# Patient Record
Sex: MALE | Race: WHITE | NOT HISPANIC OR LATINO | Employment: FULL TIME | URBAN - METROPOLITAN AREA
[De-identification: names, ages, dates, MRNs, and addresses within clinical notes are randomized per-mention and may not be internally consistent; named-entity substitution may affect disease eponyms.]

---

## 2025-02-20 ENCOUNTER — OFFICE VISIT (OUTPATIENT)
Dept: FAMILY MEDICINE | Facility: CLINIC | Age: 30
End: 2025-02-20

## 2025-02-20 VITALS
HEART RATE: 104 BPM | WEIGHT: 145 LBS | SYSTOLIC BLOOD PRESSURE: 105 MMHG | DIASTOLIC BLOOD PRESSURE: 77 MMHG | OXYGEN SATURATION: 98 % | HEIGHT: 69 IN | BODY MASS INDEX: 21.48 KG/M2

## 2025-02-20 DIAGNOSIS — Z00.00 ROUTINE GENERAL MEDICAL EXAMINATION AT A HEALTH CARE FACILITY: Primary | ICD-10-CM

## 2025-02-20 DIAGNOSIS — Z23 NEED FOR TETANUS, DIPHTHERIA, AND ACELLULAR PERTUSSIS (TDAP) VACCINE: ICD-10-CM

## 2025-02-20 DIAGNOSIS — Z11.59 NEED FOR HEPATITIS C SCREENING TEST: ICD-10-CM

## 2025-02-20 DIAGNOSIS — Z11.3 SCREEN FOR STD (SEXUALLY TRANSMITTED DISEASE): ICD-10-CM

## 2025-02-20 DIAGNOSIS — F33.0 MILD EPISODE OF RECURRENT MAJOR DEPRESSIVE DISORDER: ICD-10-CM

## 2025-02-20 DIAGNOSIS — N48.1 BALANITIS: ICD-10-CM

## 2025-02-20 LAB
% GRANULOCYTES: 70 % (ref 42.2–75.2)
ALBUMIN SERPL BCG-MCNC: 4.7 G/DL (ref 3.5–5.2)
ALP SERPL-CCNC: 74 U/L (ref 40–150)
ALT SERPL W P-5'-P-CCNC: 17 U/L (ref 0–70)
ANION GAP SERPL CALCULATED.3IONS-SCNC: 10 MMOL/L (ref 7–15)
AST SERPL W P-5'-P-CCNC: 22 U/L (ref 0–45)
BILIRUB SERPL-MCNC: 0.7 MG/DL
BUN SERPL-MCNC: 6.4 MG/DL (ref 6–20)
CALCIUM SERPL-MCNC: 9.4 MG/DL (ref 8.8–10.4)
CHLORIDE SERPL-SCNC: 101 MMOL/L (ref 98–107)
CHOLESTEROL: 134 MG/DL (ref 100–199)
CREAT SERPL-MCNC: 0.76 MG/DL (ref 0.67–1.17)
EGFRCR SERPLBLD CKD-EPI 2021: >90 ML/MIN/1.73M2
FASTING STATUS PATIENT QL REPORTED: NO
FASTING?: NO
GLUCOSE SERPL-MCNC: 82 MG/DL (ref 70–99)
HCO3 SERPL-SCNC: 29 MMOL/L (ref 22–29)
HCT VFR BLD AUTO: 41.3 % (ref 39–51)
HDL (RMG): 42 MG/DL (ref 40–?)
HEMOGLOBIN: 14.5 G/DL (ref 13.4–17.5)
LDL CALCULATED (RMG): 80 MG/DL (ref 0–130)
LYMPHOCYTES NFR BLD AUTO: 22.1 % (ref 20.5–51.1)
MCH RBC QN AUTO: 29.4 PG (ref 27–31)
MCHC RBC AUTO-ENTMCNC: 35.2 G/DL (ref 33–37)
MCV RBC AUTO: 83.5 FL (ref 80–100)
MONOCYTES NFR BLD AUTO: 7.9 % (ref 1.7–9.3)
PLATELET # BLD AUTO: 260 K/UL (ref 140–450)
POTASSIUM SERPL-SCNC: 4 MMOL/L (ref 3.4–5.3)
PROT SERPL-MCNC: 7.1 G/DL (ref 6.4–8.3)
RBC # BLD AUTO: 4.94 X10/CMM (ref 4.2–5.9)
SODIUM SERPL-SCNC: 140 MMOL/L (ref 135–145)
TRIGLYCERIDES (RMG): 60 MG/DL (ref 0–149)
TSH SERPL DL<=0.005 MIU/L-ACNC: 2.07 UIU/ML (ref 0.3–4.2)
WBC # BLD AUTO: 5.4 X10/CMM (ref 3.8–11)

## 2025-02-20 PROCEDURE — 82565 ASSAY OF CREATININE: CPT | Performed by: STUDENT IN AN ORGANIZED HEALTH CARE EDUCATION/TRAINING PROGRAM

## 2025-02-20 PROCEDURE — 36415 COLL VENOUS BLD VENIPUNCTURE: CPT | Performed by: STUDENT IN AN ORGANIZED HEALTH CARE EDUCATION/TRAINING PROGRAM

## 2025-02-20 PROCEDURE — 84443 ASSAY THYROID STIM HORMONE: CPT | Performed by: STUDENT IN AN ORGANIZED HEALTH CARE EDUCATION/TRAINING PROGRAM

## 2025-02-20 SDOH — HEALTH STABILITY: PHYSICAL HEALTH: ON AVERAGE, HOW MANY DAYS PER WEEK DO YOU ENGAGE IN MODERATE TO STRENUOUS EXERCISE (LIKE A BRISK WALK)?: 2 DAYS

## 2025-02-20 SDOH — HEALTH STABILITY: PHYSICAL HEALTH: ON AVERAGE, HOW MANY MINUTES DO YOU ENGAGE IN EXERCISE AT THIS LEVEL?: 30 MIN

## 2025-02-20 ASSESSMENT — PATIENT HEALTH QUESTIONNAIRE - PHQ9
SUM OF ALL RESPONSES TO PHQ QUESTIONS 1-9: 9
10. IF YOU CHECKED OFF ANY PROBLEMS, HOW DIFFICULT HAVE THESE PROBLEMS MADE IT FOR YOU TO DO YOUR WORK, TAKE CARE OF THINGS AT HOME, OR GET ALONG WITH OTHER PEOPLE: SOMEWHAT DIFFICULT
SUM OF ALL RESPONSES TO PHQ QUESTIONS 1-9: 9

## 2025-02-20 ASSESSMENT — ENCOUNTER SYMPTOMS
BLOOD IN STOOL: 0
PALPITATIONS: 0
CHILLS: 0
DIZZINESS: 0
NAUSEA: 0
HEMATURIA: 0
COUGH: 0
ABDOMINAL PAIN: 0
CONSTIPATION: 0
FEVER: 0
HEADACHES: 0
BLURRED VISION: 0
DIARRHEA: 0
VOMITING: 0
SHORTNESS OF BREATH: 0
SORE THROAT: 0

## 2025-02-20 ASSESSMENT — SOCIAL DETERMINANTS OF HEALTH (SDOH): HOW OFTEN DO YOU GET TOGETHER WITH FRIENDS OR RELATIVES?: TWICE A WEEK

## 2025-02-20 NOTE — PROGRESS NOTES
Preventive Care Visit  [facility]  Dolly Sauer DO, Family Medicine  Feb 20, 2025      Assessment & Plan   Problem List Items Addressed This Visit       Routine general medical examination at a health care facility - Primary     -Will order appropriate blood work for patient's age, medical conditions and BMI  -Reviewed recommended screening and health guidelines during this visit          Relevant Orders    TDAP 7+ (ADACEL,BOOSTRIX) (Completed)    HIV Antigen Antibody Combo Cascade    TSH with free T4 reflex    Lipid Profile ([facility])    CBC with Diff/Plt ([facility])    Comprehensive metabolic panel    VENOUS COLLECTION (Completed)    Balanitis     -previously used ketoconazole and betamethasone  -patient denies resolution of symptoms with this regimen  -will refer to urology         Relevant Orders    Adult Urology  Referral - To [facility] Location    Mild episode of recurrent major depressive disorder     -Positive depression screening, PHQ 9 score 9  -currently utilizing therapy  -no prescription medication at this time   -previous medications: Wellbutrin-without negative side effects, without resolution of symptoms  -Zoloft, side effects of anxiety and night terrors that lead to cessation of medication  -will continue to monitor         BMI 21.0-21.9, adult     -Diet: fairly well balanced, eats decent amount of vegetables  -Exercise: walking and lifting work, no formal exercise routine         Relevant Orders    VENOUS COLLECTION (Completed)     Other Visit Diagnoses       Screen for STD (sexually transmitted disease)        Relevant Orders    HIV Antigen Antibody Combo Cascade    Chlamydia trachomatis/Neisseria gonorrhoeae by PCR - Clinic Collect    Hepatitis C Screen Reflex to HCV RNA Quant and Genotype    VENOUS COLLECTION (Completed)    Need for tetanus, diphtheria, and acellular pertussis (Tdap) vaccine        Relevant Orders    TDAP 7+ (ADACEL,BOOSTRIX) (Completed)    Need for  hepatitis C screening test        Relevant Orders    Hepatitis C Screen Reflex to HCV RNA Quant and Genotype    VENOUS COLLECTION (Completed)          Counseling  Appropriate preventive services were addressed with this patient via screening, questionnaire, or discussion as appropriate for fall prevention, nutrition, physical activity, Tobacco-use cessation, social engagement, weight loss and cognition.  Checklist reviewing preventive services available has been given to the patient.  Reviewed patient's diet, addressing concerns and/or questions.   He is at risk for lack of exercise and has been provided with information to increase physical activity for the benefit of his well-being.   The patient was instructed to see the dentist every 6 months.   He is at risk for psychosocial distress and has been provided with information to reduce risk.   The patient's PHQ-9 score is consistent with mild depression. He was provided with information regarding depression.     Return in about 53 weeks (around 2/26/2026) for Annual Wellness Visit.    Haroldo Juan is a 29 year old, presenting for the following:  Physical (Not fasting. ) and Health Maintenance (Balanitis, was previously treated for this, but does not feel like it was entirely treated. )    HPI  Social History  Household members include self  Smoking History: never smoker  Alcohol Use History: 1-2 drinks a week  Illicit Drug Use History: no  Occupation: retail    [employer]  Dental Visit/Problems: hasn't been going, hasn't established since move  Vision Problems: has not seen   Hearing Loss: no concerns  Vaccination Status:  COVID: declines  Influenza: declines  Tdap: 2012  Diet: fairly well balanced, eats decent amount of vegetables  Exercise: walking and lifting work, no formal exercise routine  Reproductive Health:  Sexually active: N   STD testing: will do today    Screening  HIV: will do today  HCV: will do today  ACP: discussed today    Patient has  no history of allergies.    Patient has no PSHx.    Patient has a Fhx of mother with depression and thyroid disease. Maternal grandfather with depression.    Patient has a PMHx of balanitis. Patient last saw urology in 2/2024 for this problem. Patient states that he feels like he did not fully recover from this episode despite ketoconazole and steroid cream treatment.    Patient has positive PHQ9 screening today. Patient states that he has a history of depression. Patient is currently in therapy. Patient has tried Wellbutrin twice without negative side effects, but without resolution of symptoms. When patient tried Zoloft, described side effects of anxiety and night terrors that lead to cessation of medication. Patient denies desire for prescription medication at this time.    Health Care Directive  Patient does not have a Health Care Directive: Discussed advance care planning with patient; however, patient will consider.        2/20/2025   General Health   How would you rate your overall physical health? Good   Feel stress (tense, anxious, or unable to sleep) To some extent   (!) STRESS CONCERN      2/20/2025   Nutrition   Three or more servings of calcium each day? (!) I DON'T KNOW   Diet: Regular (no restrictions)    Breakfast skipped   How many servings of fruit and vegetables per day? (!) 0-1   How many sweetened beverages each day? 0-1       Multiple values from one day are sorted in reverse-chronological order         2/20/2025   Exercise   Days per week of moderate/strenous exercise 2 days   Average minutes spent exercising at this level 30 min   (!) EXERCISE CONCERN      2/20/2025   Social Factors   Frequency of gathering with friends or relatives Twice a week   Worry food won't last until get money to buy more No   Food not last or not have enough money for food? No   Do you have housing? (Housing is defined as stable permanent housing and does not include staying ouside in a car, in a tent, in an  "abandoned building, in an overnight shelter, or couch-surfing.) Yes   Are you worried about losing your housing? No   Lack of transportation? No   Unable to get utilities (heat,electricity)? No         2/20/2025   Dental   Dentist two times every year? (!) NO          Today's PHQ-9 Score:       2/20/2025    12:49 PM   PHQ-9 SCORE   PHQ-9 Total Score MyChart 9 (Mild depression)   PHQ-9 Total Score 9        Patient-reported         2/20/2025   Substance Use   Alcohol more than 3/day or more than 7/wk No   Do you use any other substances recreationally? No     Social History     Tobacco Use    Smoking status: Never    Smokeless tobacco: Never   Substance Use Topics    Alcohol use: Yes     Comment: 1-2 a week    Drug use: Never             2/20/2025   One time HIV Screening   Previous HIV test? No         2/20/2025   STI Screening   New sexual partner(s) since last STI/HIV test? (!) YES           2/20/2025   Contraception/Family Planning   Questions about contraception or family planning No        Reviewed and updated as needed this visit by Provider   Tobacco     Med Hx  Surg Hx  Fam Hx  Soc Hx Sexual Activity          Past Medical History:   Diagnosis Date    Balanitis      History reviewed. No pertinent surgical history.    Review of Systems   Constitutional:  Negative for chills and fever.   HENT:  Negative for hearing loss and sore throat.    Eyes:  Negative for blurred vision.   Respiratory:  Negative for cough and shortness of breath.    Cardiovascular:  Negative for chest pain and palpitations.   Gastrointestinal:  Negative for abdominal pain, blood in stool, constipation, diarrhea, melena, nausea and vomiting.   Genitourinary:  Negative for hematuria.   Neurological:  Negative for dizziness and headaches.         Objective    Exam  /77 (BP Location: Left arm)   Pulse 104   Ht 1.759 m (5' 9.25\")   Wt 65.8 kg (145 lb)   SpO2 98%   BMI 21.26 kg/m     Estimated body mass index is 21.26 kg/m  as " "calculated from the following:    Height as of this encounter: 1.759 m (5' 9.25\").    Weight as of this encounter: 65.8 kg (145 lb).    Physical Exam  Constitutional:       General: He is not in acute distress.     Appearance: Normal appearance.   HENT:      Head: Normocephalic and atraumatic.      Right Ear: Tympanic membrane, ear canal and external ear normal. There is no impacted cerumen.      Left Ear: Tympanic membrane, ear canal and external ear normal. There is no impacted cerumen.      Mouth/Throat:      Mouth: Mucous membranes are moist.      Pharynx: No oropharyngeal exudate.   Eyes:      General:         Right eye: No discharge.         Left eye: No discharge.      Conjunctiva/sclera: Conjunctivae normal.      Pupils: Pupils are equal, round, and reactive to light.   Cardiovascular:      Rate and Rhythm: Normal rate and regular rhythm.      Pulses: Normal pulses.           Dorsalis pedis pulses are 2+ on the right side and 2+ on the left side.      Heart sounds: Normal heart sounds. No murmur heard.  Pulmonary:      Effort: Pulmonary effort is normal. No respiratory distress.      Breath sounds: Normal breath sounds.   Abdominal:      Tenderness: There is no abdominal tenderness.   Musculoskeletal:      Right lower leg: No edema.      Left lower leg: No edema.   Lymphadenopathy:      Cervical: No cervical adenopathy.   Neurological:      General: No focal deficit present.      Mental Status: He is alert.   Psychiatric:         Thought Content: Thought content normal.       Signed Electronically by: Dolly Sauer DO    "

## 2025-02-20 NOTE — ASSESSMENT & PLAN NOTE
-Diet: fairly well balanced, eats decent amount of vegetables  -Exercise: walking and lifting work, no formal exercise routine

## 2025-02-20 NOTE — ASSESSMENT & PLAN NOTE
-previously used ketoconazole and betamethasone  -patient denies resolution of symptoms with this regimen  -will refer to urology

## 2025-02-20 NOTE — ASSESSMENT & PLAN NOTE
-Positive depression screening, PHQ 9 score 9  -currently utilizing therapy  -no prescription medication at this time   -previous medications: Wellbutrin-without negative side effects, without resolution of symptoms  -Zoloft, side effects of anxiety and night terrors that lead to cessation of medication  -will continue to monitor

## 2025-02-20 NOTE — PATIENT INSTRUCTIONS
Patient Education   Preventive Care Advice   This is general advice given by our system to help you stay healthy. However, your care team may have specific advice just for you. Please talk to your care team about your preventive care needs.  Nutrition  Eat 5 or more servings of fruits and vegetables each day.  Try wheat bread, brown rice and whole grain pasta (instead of white bread, rice, and pasta).  Get enough calcium and vitamin D. Check the label on foods and aim for 100% of the RDA (recommended daily allowance).  Lifestyle  Exercise at least 150 minutes each week  (30 minutes a day, 5 days a week).  Do muscle strengthening activities 2 days a week. These help control your weight and prevent disease.  No smoking.  Wear sunscreen to prevent skin cancer.  Have a dental exam and cleaning every 6 months.  Yearly exams  See your health care team every year to talk about:  Any changes in your health.  Any medicines your care team has prescribed.  Preventive care, family planning, and ways to prevent chronic diseases.  Shots (vaccines)   HPV shots (up to age 26), if you've never had them before.  Hepatitis B shots (up to age 59), if you've never had them before.  COVID-19 shot: Get this shot when it's due.  Flu shot: Get a flu shot every year.  Tetanus shot: Get a tetanus shot every 10 years.  Pneumococcal, hepatitis A, and RSV shots: Ask your care team if you need these based on your risk.  Shingles shot (for age 50 and up)  General health tests  Diabetes screening:  Starting at age 35, Get screened for diabetes at least every 3 years.  If you are younger than age 35, ask your care team if you should be screened for diabetes.  Cholesterol test: At age 39, start having a cholesterol test every 5 years, or more often if advised.  Bone density scan (DEXA): At age 50, ask your care team if you should have this scan for osteoporosis (brittle bones).  Hepatitis C: Get tested at least once in your life.  STIs (sexually  transmitted infections)  Before age 24: Ask your care team if you should be screened for STIs.  After age 24: Get screened for STIs if you're at risk. You are at risk for STIs (including HIV) if:  You are sexually active with more than one person.  You don't use condoms every time.  You or a partner was diagnosed with a sexually transmitted infection.  If you are at risk for HIV, ask about PrEP medicine to prevent HIV.  Get tested for HIV at least once in your life, whether you are at risk for HIV or not.  Cancer screening tests  Cervical cancer screening: If you have a cervix, begin getting regular cervical cancer screening tests starting at age 21.  Breast cancer scan (mammogram): If you've ever had breasts, begin having regular mammograms starting at age 40. This is a scan to check for breast cancer.  Colon cancer screening: It is important to start screening for colon cancer at age 45.  Have a colonoscopy test every 10 years (or more often if you're at risk) Or, ask your provider about stool tests like a FIT test every year or Cologuard test every 3 years.  To learn more about your testing options, visit:   .  For help making a decision, visit:   https://bit.ly/ox80678.  Prostate cancer screening test: If you have a prostate, ask your care team if a prostate cancer screening test (PSA) at age 55 is right for you.  Lung cancer screening: If you are a current or former smoker ages 50 to 80, ask your care team if ongoing lung cancer screenings are right for you.  For informational purposes only. Not to replace the advice of your health care provider. Copyright   2023 [facility]. All rights reserved. Clinically reviewed by the [facility] Transitions Program. [employer] 242005 - REV 01/24.  Learning About Stress  What is stress?     Stress is your body's response to a hard situation. Your body can have a physical, emotional, or mental response. Stress is a fact of life for most people, and it  affects everyone differently. What causes stress for you may not be stressful for someone else.  A lot of things can cause stress. You may feel stress when you go on a job interview, take a test, or run a race. This kind of short-term stress is normal and even useful. It can help you if you need to work hard or react quickly. For example, stress can help you finish an important job on time.  Long-term stress is caused by ongoing stressful situations or events. Examples of long-term stress include long-term health problems, ongoing problems at work, or conflicts in your family. Long-term stress can harm your health.  How does stress affect your health?  When you are stressed, your body responds as though you are in danger. It makes hormones that speed up your heart, make you breathe faster, and give you a burst of energy. This is called the fight-or-flight stress response. If the stress is over quickly, your body goes back to normal and no harm is done.  But if stress happens too often or lasts too long, it can have bad effects. Long-term stress can make you more likely to get sick, and it can make symptoms of some diseases worse. If you tense up when you are stressed, you may develop neck, shoulder, or low back pain. Stress is linked to high blood pressure and heart disease.  Stress also harms your emotional health. It can make you dobbins, tense, or depressed. Your relationships may suffer, and you may not do well at work or school.  What can you do to manage stress?  You can try these things to help manage stress:   Do something active. Exercise or activity can help reduce stress. Walking is a great way to get started. Even everyday activities such as housecleaning or yard work can help.  Try yoga or ilia chi. These techniques combine exercise and meditation. You may need some training at first to learn them.  Do something you enjoy. For example, listen to music or go to a movie. Practice your hobby or do volunteer  "work.  Meditate. This can help you relax, because you are not worrying about what happened before or what may happen in the future.  Do guided imagery. Imagine yourself in any setting that helps you feel calm. You can use online videos, books, or a teacher to guide you.  Do breathing exercises. For example:  From a standing position, bend forward from the waist with your knees slightly bent. Let your arms dangle close to the floor.  Breathe in slowly and deeply as you return to a standing position. Roll up slowly and lift your head last.  Hold your breath for just a few seconds in the standing position.  Breathe out slowly and bend forward from the waist.  Let your feelings out. Talk, laugh, cry, and express anger when you need to. Talking with supportive friends or family, a counselor, or a virgil leader about your feelings is a healthy way to relieve stress. Avoid discussing your feelings with people who make you feel worse.  Write. It may help to write about things that are bothering you. This helps you find out how much stress you feel and what is causing it. When you know this, you can find better ways to cope.  What can you do to prevent stress?  You might try some of these things to help prevent stress:  Manage your time. This helps you find time to do the things you want and need to do.  Get enough sleep. Your body recovers from the stresses of the day while you are sleeping.  Get support. Your family, friends, and community can make a difference in how you experience stress.  Limit your news feed. Avoid or limit time on social media or news that may make you feel stressed.  Do something active. Exercise or activity can help reduce stress. Walking is a great way to get started.  Where can you learn more?  Go to https://www.Learn with [employer].net/patiented  Enter N032 in the search box to learn more about \"Learning About Stress.\"  Current as of: October 24, 2023  Content Version: 14.3    2024 [employer]. "   Care instructions adapted under license by your healthcare professional. If you have questions about a medical condition or this instruction, always ask your healthcare professional. [employer] disclaims any warranty or liability for your use of this information.    Learning About Depression Screening  What is depression screening?  Depression screening is a way to see if you have depression symptoms. It may be done by a doctor or counselor. It's often part of a routine checkup. That's because your mental health is just as important as your physical health.  Depression is a mental health condition that affects how you feel, think, and act. You may:  Have less energy.  Lose interest in your daily activities.  Feel sad and grouchy for a long time.  Depression is very common. It affects people of all ages.  Many things can lead to depression. Some people become depressed after they have a stroke or find out they have a major illness like cancer or heart disease. The death of a loved one or a breakup may lead to depression. It can run in families. Most experts believe that a combination of inherited genes and stressful life events can cause it.  What happens during screening?  You may be asked to fill out a form about your depression symptoms. You and the doctor will discuss your answers. The doctor may ask you more questions to learn more about how you think, act, and feel.  What happens after screening?  If you have symptoms of depression, your doctor will talk to you about your options.  Doctors usually treat depression with medicines or counseling. Often, combining the two works best. Many people don't get help because they think that they'll get over the depression on their own. But people with depression may not get better unless they get treatment.  The cause of depression is not well understood. There may be many factors involved. But if you have depression, it's not your fault.  A serious symptom  "of depression is thinking about death or suicide. If you or someone you care about talks about this or about feeling hopeless, get help right away.  It's important to know that depression can be treated. Medicine, counseling, and self-care may help.  Where can you learn more?  Go to https://www.[employer]Usage.net/patiented  Enter T185 in the search box to learn more about \"Learning About Depression Screening.\"  Current as of: July 31, 2024  Content Version: 14.3    2024 [employer].   Care instructions adapted under license by your healthcare professional. If you have questions about a medical condition or this instruction, always ask your healthcare professional. [employer] disclaims any warranty or liability for your use of this information.    Safer Sex: Care Instructions  Overview  Safer sex is a way to reduce your risk of getting a sexually transmitted infection (STI). It can also help prevent pregnancy.  Several products can help you practice safer sex and reduce your chance of STIs. One of the best is a condom. There are internal and external condoms. You can use a special rubber sheet (dental dam) for protection during oral sex. Disposable gloves can keep your hands from touching blood, semen, or other body fluids that can carry infections.  Remember that birth control methods such as diaphragms, IUDs, foams, and birth control pills do not stop you from getting STIs.  Follow-up care is a key part of your treatment and safety. Be sure to make and go to all appointments, and call your doctor if you are having problems. It's also a good idea to know your test results and keep a list of the medicines you take.  How can you care for yourself at home?  Think about getting vaccinated to help prevent hepatitis A, hepatitis B, and human papillomavirus (HPV). They can be spread through sex.  Use a condom every time you have sex. Use an external condom, which goes on the penis. Or use an internal " "condom, which goes into the vagina or anus.  Make sure you use the right size external condom. A condom that's too small can break easily. A condom that's too big can slip off during sex.  Use a new condom each time you have sex. Be careful not to poke a hole in the condom when you open the wrapper.  Don't use an internal condom and an external condom at the same time.  Never use petroleum jelly (such as Vaseline), grease, hand lotion, baby oil, or anything with oil in it. These products can make holes in the condom.  After intercourse, hold the edge of the condom as you remove it. This will help keep semen from spilling out of the condom.  Do not have sex with anyone who has symptoms of an STI, such as sores on the genitals or mouth.  Do not drink a lot of alcohol or use drugs before sex.  Limit your sex partners. Sex with one partner who has sex only with you can reduce your risk of getting an STI.  Don't share sex toys. But if you do share them, use a condom and clean the sex toys between each use.  Talk to any partners before you have sex. Talk about what you feel comfortable with and whether you have any boundaries with sex. And find out if your partner or partners may be at risk for any STI. Keep in mind that a person may be able to spread an STI even if they do not have symptoms. You and any partners may want to get tested for STIs.  Where can you learn more?  Go to https://www.[employer]ide[employer].net/patiented  Enter B608 in the search box to learn more about \"Safer Sex: Care Instructions.\"  Current as of: April 30, 2024  Content Version: 14.3    2024 [employer].   Care instructions adapted under license by your healthcare professional. If you have questions about a medical condition or this instruction, always ask your healthcare professional. [employer] disclaims any warranty or liability for your use of this information.       "

## 2025-02-20 NOTE — ASSESSMENT & PLAN NOTE
-Will order appropriate blood work for patient's age, medical conditions and BMI  -Reviewed recommended screening and health guidelines during this visit

## 2025-03-04 NOTE — TELEPHONE ENCOUNTER
Action 2025 jtv 2:55 PM    Action Taken CSS called patient. Patient did not . CSS LVM requesting call back.      Action 2025 JTV 12:25 PM    Action Taken 2nd attempt -- CSS called patient. Patient did not . CSS LVM requesting a call back from patient.     Message sent to Nurse with update of Max Attempt made.       Action 2025 Jtv 7:50 AM    Action Taken PATIENT RETURNED CALL. Patient states he has been seen for the Balantis by his PCP and has STD testing. Patient gave VB OK to update chart.      MEDICAL RECORDS REQUEST   [facility]  [facility]  [address] [place] [address]  PHONE: [phone]  Fax: [phone]        FUTURE VISIT INFORMATION                                                   Drake Daniel, : 1995 scheduled for future visit at [facility]    APPOINTMENT INFORMATION:  Date: 2025  Provider:  Rj Duke MD  Reason for Visit/Diagnosis: Balanitis    REFERRAL INFORMATION:  Referring provider:  Dolly Sauer DO in  [facility]      RECORDS REQUESTED FOR VISIT                                                     NOTES  STATUS/DETAILS   OFFICE NOTE from referring provider  2025 -- Dolly Sauer DO in  [facility]   OFFICE NOTE from other specialist  YES, 2024 -- iKmberly Reyes, ANN, CNP @ [facility]   MEDICATION LIST  YES   LABS     URINALYSIS (UA)  no   IMAGES  no     PRE-VISIT CHECKLIST      Joint diagnostic appointment coordinated correctly          (ensure right order & amount of time) Yes   RECORD COLLECTION COMPLETE yes

## 2025-03-06 ENCOUNTER — OFFICE VISIT (OUTPATIENT)
Dept: UROLOGY | Facility: CLINIC | Age: 30
End: 2025-03-06
Payer: COMMERCIAL

## 2025-03-06 ENCOUNTER — PRE VISIT (OUTPATIENT)
Dept: UROLOGY | Facility: CLINIC | Age: 30
End: 2025-03-06

## 2025-03-06 VITALS
DIASTOLIC BLOOD PRESSURE: 74 MMHG | HEART RATE: 87 BPM | BODY MASS INDEX: 22.22 KG/M2 | OXYGEN SATURATION: 99 % | SYSTOLIC BLOOD PRESSURE: 112 MMHG | HEIGHT: 69 IN | WEIGHT: 150 LBS

## 2025-03-06 DIAGNOSIS — Z31.41 FERTILITY TESTING: Primary | ICD-10-CM

## 2025-03-06 DIAGNOSIS — N48.1 BALANITIS: ICD-10-CM

## 2025-03-06 DIAGNOSIS — L40.9 PSORIASIS: ICD-10-CM

## 2025-03-06 ASSESSMENT — PAIN SCALES - GENERAL: PAINLEVEL_OUTOF10: NO PAIN (0)

## 2025-03-06 NOTE — PROGRESS NOTES
"I am seeing Drake Daniel in consultation from Dolly Sauer  for evaluation of balanitis.    HPI:  Drake Daniel is a 29 year old male with history of balanitis.  Get inflammation and tearing of skin with intercourse.  He has tried topical steroid and AF medication for irritation.  Now has a skin bridge that is bothersome.    Started about 1 year ago with topical creams.    PMHx  No chronic medical problems   No DM.    PAST MEDICAL HX:  Past Medical History:   Diagnosis Date    Balanitis        PAST SURG HX:  No history of surgery.     FAMILY HX:  Family History   Problem Relation Age of Onset    Thyroid Disease Mother     Depression Mother     Depression Maternal Grandfather     Myocardial Infarction No family hx of     Cerebrovascular Disease No family hx of     Cancer No family hx of     Hypertension No family hx of     Hyperlipidemia No family hx of     Diabetes No family hx of        SOCIAL HX:  Social History     Tobacco Use    Smoking status: Never    Smokeless tobacco: Never   Substance Use Topics    Alcohol use: Yes     Comment: 1-2 a week    Drug use: Never       MEDICATIONS:  No prescription medications at this time.        ALLERGIES:  Patient has no known allergies.      GENERAL PHYSICAL EXAM:     /74   Pulse 87   Ht 1.753 m (5' 9\")   Wt 68 kg (150 lb)   SpO2 99%   BMI 22.15 kg/m     Constitutional: No acute distress. Well nourished.   PSYCH: normal mood and affect.  NEURO: normal gait, no focal deficits.   EYES: anicteric, EOMI, PERR.  CARDIOPULMONARY: breathing non-labored, pulse regular rate/rhythm, no peripheral edema.  GI: Abdomen soft, non-tender, nondistended   MUSCULOSKELETAL: normal limb proportions, no muscle wasting, no contractures.  SKIN: Normal virilized hair distribution, no lesions, warts or rashes over genitalia, abdomen extremities or face.  HEME/LYMPH: no ecchymosis, no lymphadenopathy in groin, no lymphedema.     EXAM:  Phallus uncircumcised, meatus adequate, no " plaques palpated. There is some mild balantitis with some dry psoriatic looking skin at the proximal glans and coronal ridge area.  There is a small skin tunnel by the dorsal coronal ridge, the ridge has been reduced a little bit here by some solid fused skin and one tiny tunnel/bridge.  Testes ++    Prostate exam: not indicated     Imaging/labs:  Lab Results   Component Value Date    CR 0.76 02/20/2025         ASSESSMENT:   Balanitis- actually appears like penile psoriasis.  Pt does have psoriasis elsewhere on the body.  He is not interested in circumcision at this time.    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  Observe small adhesions to dorsal coronal ridge.  PRN follow-up with urology.        Copied cc to Consulting provider - Dr. Sauer         Thank-you for the kind consultation.  Rj Duke MD     Urological Surgeon      --------------------------------------------------------------------------------------------------------------------   Additional Coding Information:    Problems:  3 -- one stable chronic illness    Data Reviewed  N/A     Level of risk:  3 -- low risk (e.g., OTC medication or observation, minor surgery without risks)    Time spent:  31 minutes spent on the date of the encounter doing chart review, history and exam, documentation and further activities per the note

## 2025-03-06 NOTE — LETTER
"3/6/2025       RE: Drake Daniel  [address]  [facility] [address]     Dear Colleague,    Thank you for referring your patient, Drake Daniel, to the [facility] [place] at [facility]. Please see a copy of my visit note below.    I am seeing Drake Daniel in consultation from Dolly Sauer  for evaluation of balanitis.    HPI:  Drake Daniel is a 29 year old male with history of balanitis.  Get inflammation and tearing of skin with intercourse.  He has tried topical steroid and AF medication for irritation.  Now has a skin bridge that is bothersome.    Started about 1 year ago with topical creams.    PMHx  No chronic medical problems   No DM.    PAST MEDICAL HX:  Past Medical History:   Diagnosis Date     Balanitis        PAST SURG HX:  No history of surgery.     FAMILY HX:  Family History   Problem Relation Age of Onset     Thyroid Disease Mother      Depression Mother      Depression Maternal Grandfather      Myocardial Infarction No family hx of      Cerebrovascular Disease No family hx of      Cancer No family hx of      Hypertension No family hx of      Hyperlipidemia No family hx of      Diabetes No family hx of        SOCIAL HX:  Social History     Tobacco Use     Smoking status: Never     Smokeless tobacco: Never   Substance Use Topics     Alcohol use: Yes     Comment: 1-2 a week     Drug use: Never       MEDICATIONS:  No prescription medications at this time.        ALLERGIES:  Patient has no known allergies.      GENERAL PHYSICAL EXAM:     /74   Pulse 87   Ht 1.753 m (5' 9\")   Wt 68 kg (150 lb)   SpO2 99%   BMI 22.15 kg/m     Constitutional: No acute distress. Well nourished.   PSYCH: normal mood and affect.  NEURO: normal gait, no focal deficits.   EYES: anicteric, EOMI, PERR.  CARDIOPULMONARY: breathing non-labored, pulse regular rate/rhythm, no peripheral edema.  GI: Abdomen soft, non-tender, " nondistended   MUSCULOSKELETAL: normal limb proportions, no muscle wasting, no contractures.  SKIN: Normal virilized hair distribution, no lesions, warts or rashes over genitalia, abdomen extremities or face.  HEME/LYMPH: no ecchymosis, no lymphadenopathy in groin, no lymphedema.     EXAM:  Phallus uncircumcised, meatus adequate, no plaques palpated. There is some mild balantitis with some dry psoriatic looking skin at the proximal glans and coronal ridge area.  There is a small skin tunnel by the dorsal coronal ridge, the ridge has been reduced a little bit here by some solid fused skin and one tiny tunnel/bridge.  Testes ++    Prostate exam: not indicated     Imaging/labs:  Lab Results   Component Value Date    CR 0.76 02/20/2025         ASSESSMENT:   Balanitis- actually appears like penile psoriasis.  Pt does have psoriasis elsewhere on the body.  He is not interested in circumcision at this time.    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  Observe small adhesions to dorsal coronal ridge.  PRN follow-up with urology.        Copied cc to Consulting provider - Dr. Sauer         Thank-you for the kind consultation.  Rj Duke MD     Urological Surgeon      --------------------------------------------------------------------------------------------------------------------   Additional Coding Information:    Problems:  3 -- one stable chronic illness    Data Reviewed  N/A     Level of risk:  3 -- low risk (e.g., OTC medication or observation, minor surgery without risks)    Time spent:  31 minutes spent on the date of the encounter doing chart review, history and exam, documentation and further activities per the note          Again, thank you for allowing me to participate in the care of your patient.      Sincerely,    Rj Duke MD

## 2025-03-06 NOTE — NURSING NOTE
"Chief Complaint   Patient presents with    Consult For      Balanitis       Blood pressure 112/74, pulse 87, height 1.753 m (5' 9\"), weight 68 kg (150 lb), SpO2 99%. Body mass index is 22.15 kg/m .    Patient Active Problem List   Diagnosis    Routine general medical examination at a health care facility    Balanitis    Mild episode of recurrent major depressive disorder    BMI 21.0-21.9, adult       No Known Allergies    No current outpatient medications on file.       Social History     Tobacco Use    Smoking status: Never    Smokeless tobacco: Never   Substance Use Topics    Alcohol use: Yes     Comment: 1-2 a week    Drug use: Never       Maryann Richey  3/6/2025  10:31 AM     "